# Patient Record
Sex: FEMALE | Race: ASIAN | URBAN - METROPOLITAN AREA
[De-identification: names, ages, dates, MRNs, and addresses within clinical notes are randomized per-mention and may not be internally consistent; named-entity substitution may affect disease eponyms.]

---

## 2014-07-11 LAB
HPV ABSTRACT: NORMAL
PAP-ABSTRACT: NORMAL

## 2017-09-22 LAB
HPV ABSTRACT: NORMAL
PAP-ABSTRACT: NORMAL

## 2017-09-28 ENCOUNTER — TRANSFERRED RECORDS (OUTPATIENT)
Dept: HEALTH INFORMATION MANAGEMENT | Facility: CLINIC | Age: 62
End: 2017-09-28

## 2018-05-11 ENCOUNTER — OFFICE VISIT (OUTPATIENT)
Dept: FAMILY MEDICINE | Facility: CLINIC | Age: 63
End: 2018-05-11
Payer: COMMERCIAL

## 2018-05-11 VITALS
SYSTOLIC BLOOD PRESSURE: 148 MMHG | TEMPERATURE: 98.4 F | HEIGHT: 59 IN | BODY MASS INDEX: 27.86 KG/M2 | WEIGHT: 138.2 LBS | DIASTOLIC BLOOD PRESSURE: 82 MMHG | HEART RATE: 76 BPM | OXYGEN SATURATION: 97 % | RESPIRATION RATE: 20 BRPM

## 2018-05-11 DIAGNOSIS — M54.42 ACUTE BILATERAL LOW BACK PAIN WITH BILATERAL SCIATICA: Primary | ICD-10-CM

## 2018-05-11 DIAGNOSIS — M54.41 ACUTE BILATERAL LOW BACK PAIN WITH BILATERAL SCIATICA: Primary | ICD-10-CM

## 2018-05-11 PROCEDURE — 99203 OFFICE O/P NEW LOW 30 MIN: CPT | Performed by: FAMILY MEDICINE

## 2018-05-11 RX ORDER — ASPIRIN 81 MG/1
81 TABLET, CHEWABLE ORAL
COMMUNITY
End: 2018-10-22

## 2018-05-11 RX ORDER — LISINOPRIL 20 MG/1
20 TABLET ORAL
COMMUNITY
Start: 2018-03-15 | End: 2018-10-22

## 2018-05-11 RX ORDER — CETIRIZINE HYDROCHLORIDE 10 MG/1
TABLET ORAL
COMMUNITY

## 2018-05-11 RX ORDER — CYCLOBENZAPRINE HCL 10 MG
5-10 TABLET ORAL 3 TIMES DAILY PRN
Qty: 40 TABLET | Refills: 0 | Status: SHIPPED | OUTPATIENT
Start: 2018-05-11 | End: 2018-10-22

## 2018-05-11 RX ORDER — SIMVASTATIN 20 MG
20 TABLET ORAL
COMMUNITY
Start: 2009-12-21 | End: 2018-10-22

## 2018-05-11 ASSESSMENT — PAIN SCALES - GENERAL: PAINLEVEL: NO PAIN (0)

## 2018-05-11 NOTE — Clinical Note
Please abstract the following data from this visit with this patient into the appropriate field in Epic:  Colonoscopy done on this date: 1/23/17 (exact), by this group: [facility], results were tubular adenoma on biopsy, colonoscopy due in 5 years.  Mammogram done on this date: 9/28/17 (exact), by this group: [facility], results were normal.   Pap smear with HPV co-testing done on this date: 9/22/17, by this group: [facility], both values are negative.

## 2018-05-11 NOTE — PROGRESS NOTES
SUBJECTIVE:   Yandy Crook is a 62 year old female who presents to clinic today for the following health issues:  She is accompanied by her .     She has previously doctored at [facility]. Her insurance recently changed so now she is switching to this clinic.     Back Pain       Duration: 2-3 months        Specific cause: unsure, maybe work and sitting - Long periods looking through microscope    Description:   Location of pain: low back both  Character of pain: burning and hurts more when sitting at work  Pain radiation: to the both buttocks  New numbness or weakness in legs, not attributed to pain:  no   Pain is intermittent - some days the pain is present, other days not    Intensity: Currently 0/10    History:   Pain interferes with job: YES  History of back problems: no prior back problems  Any previous MRI or X-rays: None  Sees a specialist for back pain:  No  Therapies tried without relief: alternating between Aleve and Advil - usually takes these before bedtime so is unsure how much they help, but she does note that when she wakes up she does not feel pain    Alleviating factors:   Improved by: temporary relief    Precipitating factors:  Worsened by: sitting    Functional and Psychosocial Screen (Erasto STarT Back):      Not performed today      Accompanying Signs & Symptoms:  Risk of Fracture:  None  Risk of Cauda Equina:  None  Risk of Infection:  None  Risk of Cancer:  None  Risk of Ankylosing Spondylitis:  Onset at age <35, male, AND morning back stiffness. no         Past medical, family, and social histories, medications, and allergies are reviewed and updated in [facility].     ROS:  CONSTITUTIONAL: NEGATIVE for fever, chills, change in weight  ENT/MOUTH: NEGATIVE for ear, mouth and throat problems  RESP: NEGATIVE for significant cough or SOB  CV: NEGATIVE for chest pain, palpitations or peripheral edema  ROS otherwise negative    This document serves as a record of the services and decisions  "personally performed and made by Dr. Silva. It was created on his behalf by Sandi Maloney, a trained medical scribe. The creation of this document is based the provider's statements to the medical scribe.  Sandi Maloney May 11, 2018 4:29 PM     OBJECTIVE:                                                    /82  Pulse 76  Temp 98.4  F (36.9  C) (Oral)  Resp 20  Ht 1.49 m (4' 10.66\")  Wt 62.7 kg (138 lb 3.2 oz)  SpO2 97%  Breastfeeding? No  BMI 28.24 kg/m2   Body mass index is 28.24 kg/(m^2).     GENERAL: healthy, alert and no distress  EYES: Eyes grossly normal to inspection, PERRL, EOMI, sclerae white and conjunctivae normal  MS: no gross musculoskeletal defects noted, no edema. No CVA tenderness.   SKIN: no suspicious lesions or rashes  NEURO: Normal strength and tone, sensory exam grossly normal, mentation intact, oriented times 3 and cranial nerves 2-12 intact, DTRs symmetrical, heel and toe walking normal  PSYCH: mentation appears normal, affect normal/bright     Diagnostic Test Results:  none      ASSESSMENT/PLAN:                                                      (M54.42,  M54.41) Acute bilateral low back pain with bilateral sciatica  (primary encounter diagnosis)  Comment: Exacerbated by poor posture at work. It's unclear whether her employer will be able or willing to address ergonomic issues at the workplace, so I ordered OT.   Plan: [facility], [facility] CHIROPRACTIC REFERRAL,         OCCUPATIONAL THERAPY REFERRAL, cyclobenzaprine         (FLEXERIL) 10 MG tablet        She is instructed to use either Aleve or Advil more consistently, and to take a dose before she goes to work.      The information in this document, created by the medical scribe for me, accurately reflects the services I personally performed and the decisions made by me. I have reviewed and approved this document for accuracy prior to leaving the patient care area. May 11, 2018 4:29 PM     Xavier Silva MD     "

## 2018-05-11 NOTE — MR AVS SNAPSHOT
After Visit Summary   5/11/2018    Yandy Crook    MRN: [id number]           Patient Information     Date Of Birth          1955        Visit Information        Provider Department      5/11/2018 3:45 PM Xavier Silva MD; MULTILINGUAL W[facility]        Today's Diagnoses     Acute bilateral low back pain with bilateral sciatica    -  1       Follow-ups after your visit        Additional Services     [facility] HAND, AND CH[facility]RACTIC REFERRAL       **This order will print in the [facility] Scheduling Office**    Physical Therapy, Hand Therapy and Chiropractic Care are available through:    *[facility]  *[facility]  *[facility]    Call one number to schedule at any of the above locations: [phone].    (Go to [email or web address] for a list of locations with addresses and office hours.)    Your provider has referred you to: Physical Therapy at [facility] or [facility] [place] [facility]    Indication/Reason for Referral: Low Back Pain  Onset of Illness: 2-3 months ago  Therapy Orders: Evaluate and Treat  Special Programs: None  Special Request: Equipment: As Indicated:   Special Request: Modalities: As Indicated:     Additional Comments for the Therapist or Chiropractor:     Please be aware that coverage of these services is subject to the terms and limitations of your health insurance plan.  Call member services at your health plan with any benefit or coverage questions.      Please bring the following to your appointment:    *Your personal calendar for scheduling future appointments  *Comfortable clothing            OCCUPATIONAL THERAPY REFERRAL       *This therapy referral will be filtered to a centralized scheduling office at [facility] and the patient will receive a call to schedule an appointment at a [place] location most convenient for them. *     [facility] provides Occupational Therapy  "evaluation and treatment and many specialty services across the [facility] system.  If requesting a specialty program, please choose from the list below.    If you have not heard from the scheduling office within 2 business days, please call [phone] for all locations, with the exception of [place], please call [phone] and [place], please call [phone]    Treatment: Evaluation & Treatment  Special Instructions/Modalities:   Special Programs: None    Please be aware that coverage of these services is subject to the terms and limitations of your health insurance plan.  Call member services at your health plan with any benefit or coverage questions.      **Note to Provider:  If you are referring outside of [facility] for the therapy appointment, please list the name of the location in the \"special instructions\" above, print the referral and give to the patient to schedule the appointment.                  Who to contact     If you have questions or need follow up information about today's clinic visit or your schedule please contact [facility] directly at [phone].  Normal or non-critical lab and imaging results will be communicated to you by MyChart, letter or phone within 4 business days after the clinic has received the results. If you do not hear from us within 7 days, please contact the clinic through MyChart or phone. If you have a critical or abnormal lab result, we will notify you by phone as soon as possible.  Submit refill requests through [facility] or call your pharmacy and they will forward the refill request to us. Please allow 3 business days for your refill to be completed.          Additional Information About Your Visit        ZYB Information     [facility]YB lets you send messages to your doctor, view your test results, renew your prescriptions, schedule appointments and more. To sign up, go to [email or web address] . Click on \"Log in\" on the left side of the " "screen, which will take you to the Welcome page. Then click on \"Sign up Now\" on the right side of the page.     You will be asked to enter the access code listed below, as well as some personal information. Please follow the directions to create your username and password.     Your access code is: [email or web address]  Expires: 2018  4:51 PM     Your access code will  in 90 days. If you need help or a new code, please call your [facility] clinic or [phone].        Care EveryWhere ID     This is your Care EveryWhere ID. This could be used by other organizations to access your [facility] medical records  [id number]        Your Vitals Were     Pulse Temperature Respirations Height Pulse Oximetry Breastfeeding?    76 98.4  F (36.9  C) (Oral) 20 1.49 m (4' 10.66\") 97% No    BMI (Body Mass Index)                   28.24 kg/m2            Blood Pressure from Last 3 Encounters:   18 174/90   07 124/80   06 122/70    Weight from Last 3 Encounters:   18 62.7 kg (138 lb 3.2 oz)   07 57 kg (125 lb 12 oz)   06 59.5 kg (131 lb 4 oz)              We Performed the Following     [facility], AND CHIROPRACTIC REFERRAL     OCCUPATIONAL THERAPY REFERRAL          Today's Medication Changes          These changes are accurate as of 18  4:51 PM.  If you have any questions, ask your nurse or doctor.               Start taking these medicines.        Dose/Directions    cyclobenzaprine 10 MG tablet   Commonly known as:  FLEXERIL   Used for:  Acute bilateral low back pain with bilateral sciatica   Started by:  Xavier Silva MD        Dose:  5-10 mg   Take 0.5-1 tablets (5-10 mg) by mouth 3 times daily as needed for muscle spasms (may cause dizziness or drowsiness)   Quantity:  40 tablet   Refills:  0            Where to get your medicines      These medications were sent to [facility] [id number] [place] [address], [place] - [address] AT [facility] [address], [facility] [address]     Phone:  [phone]     cyclobenzaprine 10 MG tablet                Primary Care Provider Fax #    Physician No Ref-Primary [phone]       No address on file        Equal Access to Services     ALYSSA LYLES : Lorena geiger rowdy abe Martinez, waaxda luqadaha, qaybta kaalmada ademartinez, kelly sigalamaryuri schreiber. So [facility] [phone].    ATENCIÓN: Si habla español, tiene a garcia disposición servicios gratuitos de asistencia lingüística. Llame al [phone].    We comply with applicable federal civil rights laws and [place] laws. We do not discriminate on the basis of race, color, national origin, age, disability, sex, sexual orientation, or gender identity.            Thank you!     Thank you for choosing [facility]  for your care. Our goal is always to provide you with excellent care. Hearing back from our patients is one way we can continue to improve our services. Please take a few minutes to complete the written survey that you may receive in the mail after your visit with us. Thank you!             Your Updated Medication List - Protect others around you: Learn how to safely use, store and throw away your medicines at www.disposemymeds.org.          This list is accurate as of 5/11/18  4:51 PM.  Always use your most recent med list.                   Brand Name Dispense Instructions for use Diagnosis    AMBIEN 10 MG tablet   Generic drug:  zolpidem     30    TAKE ONE-HALF TO ONE TABLET PO AT BEDTIME AS NEEDED    Insomnia, unspecified       aspirin 81 MG chewable tablet      Take 81 mg by mouth        calcium-vitamin D 600-400 MG-UNIT per tablet    CALTRATE          cetirizine 10 MG tablet    zyrTEC     Take 10 mg by mouth daily.        cyclobenzaprine 10 MG tablet    FLEXERIL    40 tablet    Take 0.5-1 tablets (5-10 mg) by mouth 3 times daily as needed for muscle spasms (may cause dizziness or drowsiness)    Acute bilateral low back pain with  bilateral sciatica       lisinopril 20 MG tablet    PRINIVIL/ZESTRIL     Take 20 mg by mouth        NO ACTIVE MEDICATIONS      .        simvastatin 20 MG tablet    ZOCOR     Take 20 mg by mouth

## 2018-05-11 NOTE — Clinical Note
Please abstract the following data from this visit with this patient into the appropriate field in Epic:  Colonoscopy done on this date: 02/23/18 (approximately), by this group: [facility], results were Normal.  Mammogram done on this date: 9/28/18 (approximately), by this group: [facility], results were Normal.  Pap smear done on this date: 9/22/18 (approximately), by this group: [facility], results were normal.

## 2018-10-22 ENCOUNTER — OFFICE VISIT (OUTPATIENT)
Dept: FAMILY MEDICINE | Facility: CLINIC | Age: 63
End: 2018-10-22
Payer: COMMERCIAL

## 2018-10-22 VITALS
RESPIRATION RATE: 20 BRPM | SYSTOLIC BLOOD PRESSURE: 170 MMHG | HEART RATE: 79 BPM | OXYGEN SATURATION: 97 % | BODY MASS INDEX: 27.99 KG/M2 | DIASTOLIC BLOOD PRESSURE: 80 MMHG | TEMPERATURE: 97.9 F | WEIGHT: 137 LBS

## 2018-10-22 DIAGNOSIS — M54.41 ACUTE BILATERAL LOW BACK PAIN WITH BILATERAL SCIATICA: ICD-10-CM

## 2018-10-22 DIAGNOSIS — E78.5 HYPERLIPIDEMIA LDL GOAL <130: ICD-10-CM

## 2018-10-22 DIAGNOSIS — E66.3 OVERWEIGHT (BMI 25.0-29.9): ICD-10-CM

## 2018-10-22 DIAGNOSIS — M54.42 ACUTE BILATERAL LOW BACK PAIN WITH BILATERAL SCIATICA: ICD-10-CM

## 2018-10-22 DIAGNOSIS — I10 ESSENTIAL HYPERTENSION: Primary | ICD-10-CM

## 2018-10-22 PROBLEM — E55.9 VITAMIN D DEFICIENCY: Status: ACTIVE | Noted: 2018-03-15

## 2018-10-22 PROCEDURE — 82043 UR ALBUMIN QUANTITATIVE: CPT | Performed by: NURSE PRACTITIONER

## 2018-10-22 PROCEDURE — 80053 COMPREHEN METABOLIC PANEL: CPT | Performed by: NURSE PRACTITIONER

## 2018-10-22 PROCEDURE — 83721 ASSAY OF BLOOD LIPOPROTEIN: CPT | Performed by: NURSE PRACTITIONER

## 2018-10-22 PROCEDURE — 99214 OFFICE O/P EST MOD 30 MIN: CPT | Performed by: NURSE PRACTITIONER

## 2018-10-22 PROCEDURE — 36415 COLL VENOUS BLD VENIPUNCTURE: CPT | Performed by: NURSE PRACTITIONER

## 2018-10-22 RX ORDER — ASPIRIN 81 MG/1
81 TABLET, CHEWABLE ORAL DAILY
Qty: 90 TABLET | Refills: 3 | Status: SHIPPED | OUTPATIENT
Start: 2018-10-22

## 2018-10-22 RX ORDER — LISINOPRIL 20 MG/1
20 TABLET ORAL DAILY
Qty: 90 TABLET | Refills: 1 | Status: SHIPPED | OUTPATIENT
Start: 2018-10-22 | End: 2018-11-05

## 2018-10-22 RX ORDER — SIMVASTATIN 20 MG
20 TABLET ORAL AT BEDTIME
Qty: 90 TABLET | Refills: 1 | Status: SHIPPED | OUTPATIENT
Start: 2018-10-22 | End: 2018-11-05

## 2018-10-22 RX ORDER — CYCLOBENZAPRINE HCL 10 MG
5-10 TABLET ORAL 3 TIMES DAILY PRN
Qty: 40 TABLET | Refills: 0 | Status: SHIPPED | OUTPATIENT
Start: 2018-10-22

## 2018-10-22 ASSESSMENT — PAIN SCALES - GENERAL: PAINLEVEL: NO PAIN (0)

## 2018-10-22 NOTE — MR AVS SNAPSHOT
After Visit Summary   10/22/2018    Yandy Crook    MRN: [id number]           Patient Information     Date Of Birth          1955        Visit Information        Provider Department      10/22/2018 6:05 PM Makeda Wu APRN CNP; [facility]        Today's Diagnoses     Essential hypertension    -  1    Hyperlipidemia LDL goal <130        Overweight (BMI 25.0-29.9)        Acute bilateral low back pain with bilateral sciatica          Care Instructions    At [facility], we strive to deliver an exceptional experience to you, every time we see you.  If you receive a survey in the mail, please send us back your thoughts. We really do value your feedback.    Your care team:                            Family Medicine Internal Medicine   MD Hasmukh Mast MD Shantel Branch-Fleming, MD Katya Georgiev PA-C Megan Hill, APRN CNP Nam Ho, MD Pediatrics   NIECY Kamara, MD Katja Jiang APRMD Essence Vasquez CNP, MD Deborah Mielke, MD Kim Thein, APRN CNP      Clinic hours: Monday - Thursday 7 am-7 pm; Fridays 7 am-5 pm.   Urgent care: Monday - Friday 11 am-9 pm; Saturday and Sunday 9 am-5 pm.  Pharmacy : Monday -Thursday 8 am-8 pm; Friday 8 am-6 pm; Saturday and Sunday 9 am-5 pm.     Clinic: [phone]   Pharmacy: [phone]        Áp Apolinar?t Samuels Không Ki?m Soát ???c (?ã ???c C?ng C?)    Áp apolinar?t c?a quý v? hôm nay samuels m?t cách khác th??ng. ?i?u này có th? x?y ra n?u quý v? b? l? các li?u l??ng thu?c tr? áp apolinar?t. Ho?c nó có th? x?y ra n?u quý v? dùng các lo?i thu?c khác. Các thu?c này mayo g?m m?t s? thu?c hít tr? b?nh monica?n, thu?c ch?ng ngh?t m?i, thu?c ?n kiêng, và ma tuý bán ngoài ???ng ph? nh? cô ca in và amphetamine.  Các nguyên nhân khác mayo g?m:    Lên cân    Thêm mu?i vào ch? ?? ?n c?a quý v?    Hút thu?c lá    Cà phê in  Áp apolinar?t c?a quý v? c?ng  có th? dori t?ng n?u quý v? b? r?i lo?n v? xúc c?m ho?c ?au ??n cùng c?c. Nó có th? tr? l?i bình th??ng lin m?t th?i k? ngh? ng?i.  S? ??c áp rosa maria?t g?m có 2 con s?. Có m?t con s? ? trên m?t con s? phía d??i. Con s? trên cùng là áp rosa maria?t tâm thu. Con s? bên d??i là áp rosa maria?t tâm tr??ng. Áp rosa maria?t bình th??ng là d??i 120 trên 80. Áp rosa maria?t tubbs (máu tubbs) là khi con s? ? trên t? 140 tr? lên. Ho?c khi con s? ? d??i t? 90 tr? lên. ?? ???c coi là áp rosa maria?t tubbs, các con s? ph?i tubbs h?n khi ???c th? wayne m?t obed?ng th?i tessa. Áp rosa maria?t gi?a bình th??ng và tubbs ???c g?i là ti?n áp rosa maria?t tubbs.  Ch?m sóc t?i dori  ?i?u lubna tr?ng là ph?i t?ng b??c tìm cách h? áp rosa maria?t c?a quý v?. N?u quý v? dùng thu?c tr? áp rosa maria?t, các h??ng d?n d??i ?ây có th? giúp quý v? c?n dùng ít thu?c ho?c không dùng thu?c wayne t??ng baker.    B?t ??u m?t ch??ng trình s?t cân n?u quý v? quá n?ng cân.    C?t gi?m l??ng mu?i wayne ch? ?? ?n c?a quý v?:  ? Tránh các th?c ph?m có huong?u mu?i nh? ô hutchison, ?? anjel, th?t hun khói, và khoai tây chiên m?n.  ? Không thêm mu?i vào th?c ?n c?a quý v? t?i bàn.  ? Ch? dùng m?t l??ng mu?i nh? khi n?u ?n.    B?t ??u m?t ch??ng trình t?p th? d?c. Bàn v?i nhân viên y t? c?a quý v? v? ch??ng trình t?p th? d?c nào là t?t nh?t cho quý v?. Không c?n ph?i khó nh?c l?m m?i làm ???c. Th?m chí ?i b? nhanh wayne 20 phút 3 l?n m?t tu?n là m?t hình th?c t?p th? d?c t?t.    Tránh dùng thu?c có ch[facility] ?ó. Thu?c này mayo g?m huong?u thu?c c?m và thu?c viên và thu?c x?t ch?ng ngh?t xoang m?i, c?ng nh? thu?c viên ?n kiêng. Ki?m tra các c?nh báo v? áp rosa maria?t tubbs trên nhãn hi?u. Các ch?t kích thí nh? amphetamine ho?c cô ca in có th? ch?t ng??i ??i v?i m?t s? ng??i b? ch?ng áp rosa maria?t tubbs. Không mayo gi? ???c dùng các thu?c này.    Gi?i h?n l??ng cà phê in mà quý v? u?ng. Ho?c ??i kenneth các th?c u?ng mà không có ch?t cà phê in.    Ng?ng hút thu?c lá. N?u quý v? là ng??i hút thu?c lá lâu n?m, ?i?u này có th? khó. Latasha avery m?omari ??pepper farley  katrina thu?c lá ?? t?o thêm c? h?i thành công cho quý v?. Bàn v?i bác s? c?a quý v? v? các ph??ng cách b? hút.    Tìm hi?u cách ?ng phó v?i s? c?ng th?ng t?t h?n. ?ây là ph?n lubna tr?ng c?a b?t c? ch??ng trình nào ?? h? áp rosa maria?t. Tìm các ph??ng cách ?? th? giãn. Các cách nào mayo g?m thi?n ??nh, t?p yoga, và ph?n h?i sinh h?c.    N?u thu?c ???c kê toa, fern dùng ?úng dillon ch? d?n. Vi?c b? l? các li?u l??ng có th? làm cho áp rosa maria?t c?a quý v? không ki?m ch? ???c.    Xét ??n vi?c michelle m?t máy t? ??ng ?o áp rosa maria?t. Nh?ng máy này có bán t?i huong?u d??c phòng. Dùng máy này ?? dillon dõi áp rosa maria?t c?a quý v?. Báo cáo k?t qu? cho bác s? bi?t.  Ch?m sóc dillon dõi  ?i khám th??ng xuyên v?i nhân viên y t? c?a quý v? v? áp rosa maria?t và ki?m tra thu?c men là m?t ph?n lubna tr?ng wayne s? ch?m sóc c?a quý v?. L?y h?n ?i khám dillon dõi dillon s? ch? d?n.  Khi nào ?i tìm s? khuyên nh? v? y t?  G?i nhân viên y t? ngay n?u quý v? b? b?t c? nh?ng ?i?u nào lin ?ây:    ?au ng?c, cánh davide, vai, c?, ho?c ph?n l?ng phía trên    Th? d?c    Nh?c ??u n?ng    Có ti?ng thình th?ch ho?c xào s?c wayne whitney    Ch?y máu cam    C?c k? bu?n ng?, l?n l?n, ho?c ng?t x?u    Chóng m?t ho?c chóng m?t v?i c?m giác sherley cu?ng (kim m?t)    Y?u n?i cánh davide ho?c c?ng chân ho?c ? m?t bên m?t    Khó nói n?ng ho?c nhìn   Date Last Reviewed: 11/25/2014 2000-2017 [employer]. [address] [place] [address], [place] [address]. All rights reserved. This information is not intended as a substitute for professional medical care. Always follow your healthcare professional's instructions.        T? ch?m sóc ?au l?ng d??i (low back pain)  H?u h?t m?i ng??i ??u th?nh tho?ng b? ?au l?ng d??i. Wayne huong?u tr??ng h?p, v?n ?? không nghiêm tr?ng và ch? c?n t? ch?m sóc là ???c. ?ôi khi ?au l?ng d??i có th? là d?u hi?u cho th?y có m?t v?n ?? l?n h?n. Hãy g?i cho bác s? c?a quý v? n?u c?n ?au tr? l?i th??ng xuyên ho?c ngày càng t? h?n. ?? ch?m sóc l?ng c?a quý v? v? lâu dài, fern th??pepper wilhelm  t?p th? d?ng, gi?m cân n?ng d? th?a và h?c các t? th? t?t.    Ngh? ng?i ng?n  ?? l?ng c?a quý v? ngh? ng?i m?t lanny ngày ?? h?i ph?c. Dùng ??m c?ng ho?c sàn nhà. S? d?ng g?i ho?c kh?n nh? ?? ch?c ch?n ph?n l?ng d??i c?a quý v?. Gi? cho ??u g?i c?a quý v? h?i kobe và ??t m?t chi?c g?i khác bên d??i. Lin vài gi?, ng?i d?y và ?i l?i càng huong?u càng t?t.  Gi?m ?au và s?ng  L?nh giúp gi?m s?ng. C? l?nh và nóng ??u có th? gi?m ?au. B?o v? da c?a quý v? b?ng cách ??t m?t t?m kh?n gi?a c? th? c?a quý v? và luis?n l?nh ho?c nóng.    Wayne vài ngày ??u tiên, ch??m m?t túi ?á wayne 10-15 phút m?i gi? wayne khi quý v? ?ang th?c.    Lin vài ngày ??u, th? dùng huong?t ?? gi?m c?n ?au.    Thu?c bán kenneth qu?y có th? giúp ki?m soát c?n ?au và s?ng. Hãy th? aspirin ho?c thu?c thay th? aspirin, ch?ng h?n nh? ibuprofen.    T?p th? d?c  T?p th? d?c có th? giúp l?ng c?a quý v? h?i ph?c. Nó c?ng giúp l?ng c?a quý v? kh?e h?n và lucita ho?t h?n, ng?n ng?a tái ch?n th??ng. Hãy h?i bác s? c?a quý v? v? các bài t?p th? d?c c? th? dành cho l?ng c?a mình.  Áp d?ng t? th? t?t ?? tránh tái ch?n th??ng    Khi di jacek?n, hãy kobe hông và g?i. ??ng kobe ? vùng th?t l?ng hay v?n quanh.    Khi nâng, gi? ?? v?t sát v?i c? th? c?a quý v?. ??ng c? nâng ?? n?ng quá s?c c?a b?n thân.    Khi ng?i, nh? ?? ph?n l?ng d??i c?a quý v?. S? d?ng kh?n qu?n quanh n?u c?n.  Hãy g?i cho bác s? c?a quý v? n?u:    Quý v? không th? ??ng ho?c ?i b?.    Quý v? b? s?t trên 101,0 F.    Quý v? ?i ti?u th??ng xuyên, ?au ho?c ra máu.    Quý v? b? ?au b?ng d? d?i.    Quý v? b? ?au nh? c?t ho?c ?âm.    C?n ?au c?a quý v? di?n ra không d?t.    Quý v? b? ?au ho?c tê chân.    Quý v? c?m th?y ?au ? m?t vùng m?i trên l?ng c?a mình.    Quý v? ?? ý th?y c?n ?au không thuyên gi?m lin h?n m?t tu?n.   Date Last Reviewed: 9/29/2015 2000-2017 [employer]. [address] [place] [address], [place] [address]. All rights reserved. This information is not intended as a substitute for  "professional medical care. Always follow your healthcare professional's instructions.                Follow-ups after your visit        Follow-up notes from your care team     Return in about 2 weeks (around 2018), or if symptoms worsen or fail to improve, for BP Recheck.      Who to contact     If you have questions or need follow up information about today's clinic visit or your schedule please contact [facility] directly at [phone].  Normal or non-critical lab and imaging results will be communicated to you by [employer], letter or phone within 4 business days after the clinic has received the results. If you do not hear from us within 7 days, please contact the clinic through [employer] or phone. If you have a critical or abnormal lab result, we will notify you by phone as soon as possible.  Submit refill requests through [employer] or call your pharmacy and they will forward the refill request to us. Please allow 3 business days for your refill to be completed.          Additional Information About Your Visit        [employer] Information     [employer] lets you send messages to your doctor, view your test results, renew your prescriptions, schedule appointments and more. To sign up, go to [email or web address] . Click on \"Log in\" on the left side of the screen, which will take you to the Welcome page. Then click on \"Sign up Now\" on the right side of the page.     You will be asked to enter the access code listed below, as well as some personal information. Please follow the directions to create your username and password.     Your access code is: [email or web address]  Expires: 2019  7:05 PM     Your access code will  in 90 days. If you need help or a new code, please call your [place] clinic or [phone].        Care EveryWhere ID     This is your Care EveryWhere ID. This could be used by other organizations to access your [place] medical records  [id number]        Your Vitals Were  "    Pulse Temperature Respirations Pulse Oximetry BMI (Body Mass Index)       79 97.9  F (36.6  C) (Oral) 20 97% 27.99 kg/m2        Blood Pressure from Last 3 Encounters:   10/22/18 170/80   05/11/18 148/82   02/20/07 124/80    Weight from Last 3 Encounters:   10/22/18 137 lb (62.1 kg)   05/11/18 138 lb 3.2 oz (62.7 kg)   02/20/07 125 lb 12 oz (57 kg)              We Performed the Following     Albumin Random Urine Quantitative with Creat Ratio     Comprehensive metabolic panel     LDL cholesterol direct          Today's Medication Changes          These changes are accurate as of 10/22/18  7:06 PM.  If you have any questions, ask your nurse or doctor.               These medicines have changed or have updated prescriptions.        Dose/Directions    aspirin 81 MG chewable tablet   This may have changed:  when to take this   Used for:  Essential hypertension   Changed by:  Makeda Wu APRN CNP        Dose:  81 mg   Take 1 tablet (81 mg) by mouth daily   Quantity:  90 tablet   Refills:  3       lisinopril 20 MG tablet   Commonly known as:  PRINIVIL/ZESTRIL   This may have changed:  when to take this   Used for:  Essential hypertension   Changed by:  Makeda Wu APRN CNP        Dose:  20 mg   Take 1 tablet (20 mg) by mouth daily   Quantity:  90 tablet   Refills:  1       simvastatin 20 MG tablet   Commonly known as:  ZOCOR   This may have changed:  when to take this   Used for:  Hyperlipidemia LDL goal <130   Changed by:  Makeda Wu APRN CNP        Dose:  20 mg   Take 1 tablet (20 mg) by mouth At Bedtime   Quantity:  90 tablet   Refills:  1            Where to get your medicines      These medications were sent to [id number] - [place], [place] - [address] AT [facility] [address], [facility] [address]     Phone:  [phone]     aspirin 81 MG chewable tablet    cyclobenzaprine 10 MG tablet    lisinopril 20 MG tablet    simvastatin 20 MG  tablet                Primary Care Provider Fax #    Physician No Ref-Primary [phone]       No address on file        Equal Access to Services     ALYSSA LYLES : Hadii aad ku hadbren Martinez, warolandada lutony, yvonne kamichaelda enriqueta, kelly fernandain hayaageovanna khannas sanchez labenjamingeovanna schreiber. So [facility] [phone].    ATENCIÓN: Si habla español, tiene a garcia disposición servicios gratuitos de asistencia lingüística. Llame al [phone].    We comply with applicable federal civil rights laws and [place] laws. We do not discriminate on the basis of race, color, national origin, age, disability, sex, sexual orientation, or gender identity.            Thank you!     Thank you for choosing [facility]  for your care. Our goal is always to provide you with excellent care. Hearing back from our patients is one way we can continue to improve our services. Please take a few minutes to complete the written survey that you may receive in the mail after your visit with us. Thank you!             Your Updated Medication List - Protect others around you: Learn how to safely use, store and throw away your medicines at www.disposemymeds.org.          This list is accurate as of 10/22/18  7:06 PM.  Always use your most recent med list.                   Brand Name Dispense Instructions for use Diagnosis    aspirin 81 MG chewable tablet     90 tablet    Take 1 tablet (81 mg) by mouth daily    Essential hypertension       calcium carbonate 600 mg-vitamin D 400 units 600-400 MG-UNIT per tablet    CALTRATE          cetirizine 10 MG tablet    zyrTEC     Take 10 mg by mouth daily.        cyclobenzaprine 10 MG tablet    FLEXERIL    40 tablet    Take 0.5-1 tablets (5-10 mg) by mouth 3 times daily as needed for muscle spasms (may cause dizziness or drowsiness)    Acute bilateral low back pain with bilateral sciatica       lisinopril 20 MG tablet    PRINIVIL/ZESTRIL    90 tablet    Take 1 tablet (20 mg) by mouth daily    Essential  hypertension       simvastatin 20 MG tablet    ZOCOR    90 tablet    Take 1 tablet (20 mg) by mouth At Bedtime    Hyperlipidemia LDL goal <130

## 2018-10-22 NOTE — PATIENT INSTRUCTIONS
At [facility], we strive to deliver an exceptional experience to you, every time we see you.  If you receive a survey in the mail, please send us back your thoughts. We really do value your feedback.    Your care team:                            Family Medicine Internal Medicine   MD Hasmukh Mast MD Shantel Branch-Fleming, MD Katya Georgiev PA-C Megan Hill, APRN LUIS Gerard, MD Pediatrics   Nir Reid, NIECY Butler, MD Katja Jiang APRN CNP   MD Essence Ramirez MD Deborah Mielke, MD Yandy Wu, APRN [facility]      Clinic hours: Monday - Thursday 7 am-7 pm; Fridays 7 am-5 pm.   Urgent care: Monday - Friday 11 am-9 pm; Saturday and Sunday 9 am-5 pm.  Pharmacy : Monday -Thursday 8 am-8 pm; Friday 8 am-6 pm; Saturday and Sunday 9 am-5 pm.     Clinic: [phone]   Pharmacy: [phone]        Áp Apolinar?t Samuels Không Ki?m Soát ???c (?ã ???c C?ng C?)    Áp apolinar?t c?a quý v? hôm nay samuels m?t cách khác th??ng. ?i?u này có th? x?y ra n?u quý v? b? l? các li?u l??ng thu?c tr? áp apolinar?t. Ho?c nó có th? x?y ra n?u quý v? dùng các lo?i thu?c khác. Các thu?c này mayo g?m m?t s? thu?c hít tr? b?nh monica?n, thu?c ch?ng ngh?t m?i, thu?c ?n kiêng, và ma tuý bán ngoài ???ng ph? nh? cô ca in và amphetamine.  Các nguyên nhân khác mayo g?m:    Lên cân    Thêm mu?i vào ch? ?? ?n c?a quý v?    Hút thu?c lá    Cà phê in  Áp apolinar?t c?a quý v? c?ng có th? dori t?ng n?u quý v? b? r?i lo?n v? xúc c?m ho?c ?au ??n cùng c?c. Nó có th? tr? l?i bình th??ng lin m?t th?i k? ngh? ng?i.  S? ??c áp apolinar?t g?m có 2 con s?. Có m?t con s? ? trên m?t con s? phía d??i. Con s? trên cùng là áp apolinar?t tâm thu. Con s? bên d??i là áp apolinar?t tâm tr??ng. Áp apolinar?t bình th??ng là d??i 120 trên 80. Áp apolinar?t samuels (máu samuels) là khi con s? ? trên t? 140 tr? lên. Ho?c khi con s? ? d??i t? 90 tr? lên. ?? ???c coi là áp apolinar?t samuels, các con s? ph?i samuels h?n khi ???c th? wayne m?t obed?ng th?nidia zarate.  Áp rosa maria?t gi?a bình th??ng và tubbs ???c g?i là ti?n áp rosa maria?t tubbs.  Ch?m sóc t?i dori  ?i?u lubna tr?ng là ph?i t?ng b??c tìm cách h? áp rosa maria?t c?a quý v?. N?u quý v? dùng thu?c tr? áp rosa maria?t, các h??ng d?n d??i ?ây có th? giúp quý v? c?n dùng ít thu?c ho?c không dùng thu?c wayne t??ng baker.    B?t ??u m?t ch??ng trình s?t cân n?u quý v? quá n?ng cân.    C?t gi?m l??ng mu?i wayne ch? ?? ?n c?a quý v?:  ? Tránh các th?c ph?m có huong?u mu?i nh? ô hutch[place], ?? anjel, th?t hun khói, và khoai tây chiên m?n.  ? Không thêm mu?i vào th?c ?n c?a quý v? t?i bàn.  ? Ch? dùng m?t l??ng mu?i nh? khi n?u ?n.    B?t ??u m?t ch??ng trình t?p th? d?c. Bàn v?i nhân viên y t? c?a quý v? v? ch??ng trình t?p th? d?c nào là t?t nh?t cho quý v?. Không c?n ph?i khó nh?c l?m m?i làm ???c. Th?m chí ?i b? nhanh wayne 20 phút 3 l?n m?t tu?n là m?t hình th?c t?p th? d?c t?t.    Tránh dùng thu?c có ch?t kích thích jen wayne ?ó. Thu?c này mayo g?m huong?u thu?c c?m và thu?c viên và thu?c x?t ch?ng ngh?t xoang m?i, c?ng nh? thu?c viên ?n kiêng. Ki?m tra các c?nh báo v? áp rosa maria?t tubbs trên nhãn hi?u. Các ch?t kích thích nh? amphetamine ho?c cô ca in có th? ch?t ng??i ??i v?i m?t s? ng??i b? ch?ng áp rosa maria?t tubbs. Không mayo gi? ???c dùng các thu?c này.    Gi?i h?n l??ng cà phê in mà quý v? u?ng. Ho?c ??i kenneth các th?c u?ng mà không có ch?t cà phê in.    Ng?ng hút thu?c lá. N?u quý v? là ng??i hút thu?c lá lâu n?m, ?i?u này có th? khó. Ghi marcela vào m?t ch??ng trình katrina thu?c lá ?? t?o thêm c? h?i thành công cho quý v?. Bàn v?i bác s? c?a quý v? v? các ph??ng cách b? hút.    Tìm hi?u cách ?ng phó v?i s? c?ng th?ng t?t h?n. ?ây là ph?n lubna tr?ng c?a b?t c? ch??ng trình nào ?? h? áp rosa maria?t. Tìm các ph??ng cách ?? th? giãn. Các cách nào mayo g?m thi?n ??nh, t?p yoga, và ph?n h?i sinh h?c.    N?u thu?c ???c kê torocky, fern rick ?úng dillon ch? d?n. Vi?c b? l? các li?u l??ng có th? làm cho áp rosa maria?t c?a quý v? không ki?m ch? ???c.    Xét ??n vi?c michelle m?t máy t? ??ng ?o áp rosa maria?t. Nh?ng  máy này có bán t?i huong?u d??c phòng. Dùng máy này ?? dillon dõi áp rosa maria?t c?a quý v?. Báo cáo k?t qu? cho bác s? bi?t.  Ch?m sóc dillon dõi  ?i khám th??ng xuyên v?i nhân viên y t? c?a quý v? v? áp rosa maria?t và ki?m tra thu?c men là m?t ph?n lubna tr?ng wayne s? ch?m sóc c?a quý v?. L?y h?n ?i khám idllon dõi dillon s? ch? d?n.  Khi nào ?i tìm s? khuyên nh? v? y t?  G?i nhân viên y t? ngay n?u quý v? b? b?t c? nh?ng ?i?u nào ciara ?ây:    ?au ng?c, cánh davide, vai, c?, ho?c ph?n l?ng phía trên    Th? d?c    Nh?c ??u n?ng    Có ti?ng thình th?ch ho?c xào s?c wayne whitney    Ch?y máu cam    C?c k? bu?n ng?, l?n l?n, ho?c ng?t x?u    Chóng m?t ho?c chóng m?t v?i c?m giác sherley cu?ng (kim m?t)    Y?u n?i cánh davide ho?c c?ng chân ho?c ? m?t bên m?t    Khó nói n?ng ho?c nhìn   Date Last Reviewed: 11/25/2014 2000-2017 [employer]. [address] [place] [address], [place] [address]. All rights reserved. This information is not intended as a substitute for professional medical care. Always follow your healthcare professional's instructions.        T? ch?m sóc ?au l?ng d??i (low back pain)  H?u h?t m?i ng??i ??u th?nh tho?ng b? ?au l?ng d??i. Wayne huong?u tr??ng h?p, v?n ?? không nghiêm tr?ng và ch? c?n t? ch?m sóc là ???c. ?ôi khi ?au l?ng d??i có th? là d?u hi?u cho th?y có m?t v?n ?? l?n h?n. Hãy g?i cho bác s? c?a quý v? n?u c?n ?au tr? l?i th??ng xuyên ho?c ngày càng t? h?n. ?? ch?m sóc l?ng c?a quý v? v? lâu dài, hãy th??ng xuyên t?p th? d?ng, gi?m cân n?ng d? th?a và h?c các t? th? t?t.    Ngh? ng?i ng?n  ?? l?ng c?a quý v? ngh? ng?i m?t lanny ngày ?? h?i ph?c. Dùng ??m c?ng ho?c sàn nhà. S? d?ng g?i ho?c kh?n nh? ?? ch?c ch?n ph?n l?ng d??i c?a quý v?. Gi? cho ??u g?i c?a quý v? h?i kobe và ??t m?t chi?c g?i khác bên d??i. Ciara vài gi?, ng?i d?y và ?i l?i càng huong?u càng t?t.  Gi?m ?au và s?ng  L?nh giúp gi?m s?ng. C? l?nh và nóng ??u có th? gi?m ?au. B?o v? da c?a quý v? b?ng cách ??t m?t t?m kh?n gi?a c? th? c?a quý v? và luis?n l?nh  ho?c nóng.    Wayne vài ngày ??u tiên, ch??m m?t túi ?á wayne 10-15 phút m?i gi? wayne khi quý v? ?ang th?c.    Ciara vài ngày ??u, th? dùng huong?t ?? gi?m c?n ?au.    Thu?c bán kenneth qu?y có th? giúp ki?m soát c?n ?au và s?ng. Hãy th? aspirin ho?c thu?c thay th? aspirin, ch?ng h?n nh? ibuprofen.    T?p th? d?c  T?p th? d?c có th? giúp l?ng c?a quý v? h?i ph?c. Nó c?ng giúp l?ng c?a quý v? kh?e h?n và lucita ho?t h?n, ng?n ng?a tái ch?n th??ng. Hãy h?i bác s? c?a quý v? v? các bài t?p th? d?c c? th? dành cho l?ng c?a mình.  Áp d?ng t? th? t?t ?? tránh tái ch?n th??ng    Khi di jacek?n, hãy kobe hông và g?i. ??ng kobe ? vùng th?t l?ng hay v?n quanh.    Khi nâng, gi? ?? v?t sát v?i c? th? c?a quý v?. ??ng c? nâng ?? n?ng quá s?c c?a b?n thân.    Khi ng?i, nh? ?? ph?n l?ng d??i c?a quý v?. S? d?ng kh?n qu?n quanh n?u c?n.  Hãy g?i cho bác s? c?a quý v? n?u:    Quý v? không th? ??ng ho?c ?i b?.    Quý v? b? s?t trên 101,0 F.    Quý v? ?i ti?u th??ng xuyên, ?au ho?c ra máu.    Quý v? b? ?au b?ng d? d?i.    Quý v? b? ?au nh? c?t ho?c ?âm.    C?n ?au c?a quý v? di?n ra không d?t.    Quý v? b? ?au ho?c tê chân.    Quý v? c?m th?y ?au ? m?t vùng m?i trên l?ng c?a mình.    Quý v? ?? ý th?y c?n ?au không thuyên gi?m ciara h?n m?t tu?n.   Date Last Reviewed: 9/29/2015 2000-2017 [employer]. [address] [place] [address], [place] [address]. All rights reserved. This information is not intended as a substitute for professional medical care. Always follow your healthcare professional's instructions.

## 2018-10-22 NOTE — Clinical Note
Please abstract the following data from this visit with this patient into the appropriate field in Epic:  Influenza vaccine last month per patient at an allergists office (unknown name).

## 2018-10-22 NOTE — PROGRESS NOTES
SUBJECTIVE:   Yandy Crook is a 63 year old female who presents to clinic today for the following health issues:    Hyperlipidemia Follow-Up      Rate your low fat/cholesterol diet?: not monitoring fat    Taking statin?  Yes, no muscle aches from statin    Other lipid medications/supplements?:  none  Ran out of Zocor 2 weeks ago.    Hypertension Follow-up      Outpatient blood pressures are not being checked.    Low Salt Diet: low salt    Amount of exercise or physical activity: None    Problems taking medications regularly: No    Medication side effects: none    Diet: low salt  Ran out of Lisinopril 2 weeks ago.      Back pain- ongoing for several months. She sits for prolonged periods, works  At [employer], has pain with prolonged sitting, pain radiates to both buttocks, no weakness in legs.  She has been seen for this in the past and given NSAID and muscle relaxer which helps with pain.   She has not been seen by physical therapy.    Problem list and histories reviewed & adjusted, as indicated.  Additional history: as documented    Patient Active Problem List   Diagnosis     Insomnia     No past surgical history on file.    Social History   Substance Use Topics     Smoking status: Never Smoker     Smokeless tobacco: Never Used     Alcohol use No     Family History   Problem Relation Age of Onset     Family History Negative No family hx of          Current Outpatient Prescriptions   Medication Sig Dispense Refill     aspirin 81 MG chewable tablet Take 81 mg by mouth       calcium-vitamin D (CALTRATE) 600-400 MG-UNIT per tablet        cetirizine (ZYRTEC) 10 MG tablet Take 10 mg by mouth daily.       cyclobenzaprine (FLEXERIL) 10 MG tablet Take 0.5-1 tablets (5-10 mg) by mouth 3 times daily as needed for muscle spasms (may cause dizziness or drowsiness) 40 tablet 0     lisinopril (PRINIVIL/ZESTRIL) 20 MG tablet Take 20 mg by mouth       simvastatin (ZOCOR) 20 MG tablet Take 20 mg by mouth       BP  Readings from Last 3 Encounters:   10/22/18 170/80   05/11/18 148/82   02/20/07 124/80    Wt Readings from Last 3 Encounters:   10/22/18 137 lb (62.1 kg)   05/11/18 138 lb 3.2 oz (62.7 kg)   02/20/07 125 lb 12 oz (57 kg)                    Reviewed and updated as needed this visit by clinical staff  Tobacco  Allergies  Meds       Reviewed and updated as needed this visit by Provider         ROS:  Constitutional, HEENT, cardiovascular, pulmonary, gi and gu systems are negative, except as otherwise noted.    OBJECTIVE:     /80 (BP Location: Left arm, Patient Position: Chair, Cuff Size: Adult Regular)  Pulse 79  Temp 97.9  F (36.6  C) (Oral)  Resp 20  Wt 137 lb (62.1 kg)  SpO2 97%  BMI 27.99 kg/m2  Body mass index is 27.99 kg/(m^2).  GENERAL: healthy, alert and no distress  EYES: Eyes grossly normal to inspection, PERRL and conjunctivae and sclerae normal  HENT: ear canals and TM's normal, nose and mouth without ulcers or lesions  NECK: no adenopathy, no asymmetry, masses, or scars and thyroid normal to palpation  RESP: lungs clear to auscultation - no rales, rhonchi or wheezes  CV: regular rate and rhythm, normal S1 S2, no S3 or S4, no murmur, click or rub, no peripheral edema and peripheral pulses strong  ABDOMEN: soft, nontender, no hepatosplenomegaly, no masses and bowel sounds normal  MS: no gross musculoskeletal defects noted, no edema  SKIN: no suspicious lesions or rashes  NEURO: Normal strength and tone, sensory exam grossly normal, mentation intact, oriented times 3 and cranial nerves 2-12 intact, DTRs symmetrical, heel and toe walking normal  BACK: no CVA tenderness, no paralumbar tenderness  PSYCH: mentation appears normal, affect normal/bright  LYMPH: normal ant/post cervical, supraclavicular nodes    Diagnostic Test Results:  No results found for this or any previous visit (from the past 24 hour(s)).    ASSESSMENT/PLAN:         BMI:   Estimated body mass index is 27.99 kg/(m^2) as  "calculated from the following:    Height as of 5/11/18: 4' 10.66\" (1.49 m).    Weight as of this encounter: 137 lb (62.1 kg).   Weight management plan: Discussed healthy diet and exercise guidelines and patient will follow up in 6 months in clinic to re-evaluate.      1. Essential hypertension  Uncontrolled but she has been off her medication for 2 weeks  Getting her back on Lisinopril and she is to watch the salt in her diet, recheck BP in 2 weeks.  - Albumin Random Urine Quantitative with Creat Ratio  - Comprehensive metabolic panel  - aspirin 81 MG chewable tablet; Take 1 tablet (81 mg) by mouth daily  Dispense: 90 tablet; Refill: 3  - lisinopril (PRINIVIL/ZESTRIL) 20 MG tablet; Take 1 tablet (20 mg) by mouth daily  Dispense: 90 tablet; Refill: 1    2. Hyperlipidemia LDL goal <130  Getting her back on Zocor, checking LDL today.  Low chol diet discussed as well as benefits of regular exercise.  - LDL cholesterol direct  - Comprehensive metabolic panel  - simvastatin (ZOCOR) 20 MG tablet; Take 1 tablet (20 mg) by mouth At Bedtime  Dispense: 90 tablet; Refill: 1    3. Overweight (BMI 25.0-29.9)  Benefits of weight loss reviewed in detail, encouraged her to cut back on the carbohydrates in the diet, consume more fruits and vegetables, drink plenty of water, avoid fruit juices, sodas, get 150 min moderate exercise/week.  Recheck weight in 6 months.      4. Acute bilateral low back pain with bilateral sciatica  Refilled flexeril and she can take 400-600 mg Ibuprofen every 6 hours with food prn low back pain.  She is not interested in physical therapy referral due to her work schedule.  Return to clinic if not improved, new, or worsening symptoms.   - cyclobenzaprine (FLEXERIL) 10 MG tablet; Take 0.5-1 tablets (5-10 mg) by mouth 3 times daily as needed for muscle spasms (may cause dizziness or drowsiness)  Dispense: 40 tablet; Refill: 0    See Patient Instructions    AISLINN Schaeffer [facility] " [facility]

## 2018-10-22 NOTE — LETTER
October 24, 2018      Yandy Crook  [address] [address]              Onel Kebede,    I am happy to let you know that your labs look good.  Keep up the good work!  Feel free to contact me with any questions or concerns.  Thank you for allowing me to participate in your care.    Makeda Wu APRN, CNP/ag  Results for orders placed or performed in visit on 10/22/18   LDL cholesterol direct   Result Value Ref Range    LDL Cholesterol Direct 102 (H) <100 mg/dL   Albumin Random Urine Quantitative with Creat Ratio   Result Value Ref Range    Creatinine Urine 80 mg/dL    Albumin Urine mg/L 6 mg/L    Albumin Urine mg/g Cr 7.44 0 - 25 mg/g Cr   Comprehensive metabolic panel   Result Value Ref Range    Sodium 141 133 - 144 mmol/L    Potassium 4.0 3.4 - 5.3 mmol/L    Chloride 107 94 - 109 mmol/L    Carbon Dioxide 30 20 - 32 mmol/L    Anion Gap 4 3 - 14 mmol/L    Glucose 86 70 - 99 mg/dL    Urea Nitrogen 13 7 - 30 mg/dL    Creatinine 0.93 0.52 - 1.04 mg/dL    GFR Estimate 61 >60 mL/min/1.7m2    GFR Estimate If Black 74 >60 mL/min/1.7m2    Calcium 9.1 8.5 - 10.1 mg/dL    Bilirubin Total 0.4 0.2 - 1.3 mg/dL    Albumin 4.4 3.4 - 5.0 g/dL    Protein Total 8.5 6.8 - 8.8 g/dL    Alkaline Phosphatase 57 40 - 150 U/L    ALT 35 0 - 50 U/L    AST 23 0 - 45 U/L

## 2018-10-23 LAB
ALBUMIN SERPL-MCNC: 4.4 G/DL (ref 3.4–5)
ALP SERPL-CCNC: 57 U/L (ref 40–150)
ALT SERPL W P-5'-P-CCNC: 35 U/L (ref 0–50)
ANION GAP SERPL CALCULATED.3IONS-SCNC: 4 MMOL/L (ref 3–14)
AST SERPL W P-5'-P-CCNC: 23 U/L (ref 0–45)
BILIRUB SERPL-MCNC: 0.4 MG/DL (ref 0.2–1.3)
BUN SERPL-MCNC: 13 MG/DL (ref 7–30)
CALCIUM SERPL-MCNC: 9.1 MG/DL (ref 8.5–10.1)
CHLORIDE SERPL-SCNC: 107 MMOL/L (ref 94–109)
CO2 SERPL-SCNC: 30 MMOL/L (ref 20–32)
CREAT SERPL-MCNC: 0.93 MG/DL (ref 0.52–1.04)
CREAT UR-MCNC: 80 MG/DL
GFR SERPL CREATININE-BSD FRML MDRD: 61 ML/MIN/1.7M2
GLUCOSE SERPL-MCNC: 86 MG/DL (ref 70–99)
LDLC SERPL DIRECT ASSAY-MCNC: 102 MG/DL
MICROALBUMIN UR-MCNC: 6 MG/L
MICROALBUMIN/CREAT UR: 7.44 MG/G CR (ref 0–25)
POTASSIUM SERPL-SCNC: 4 MMOL/L (ref 3.4–5.3)
PROT SERPL-MCNC: 8.5 G/DL (ref 6.8–8.8)
SODIUM SERPL-SCNC: 141 MMOL/L (ref 133–144)

## 2018-11-05 ENCOUNTER — OFFICE VISIT (OUTPATIENT)
Dept: FAMILY MEDICINE | Facility: CLINIC | Age: 63
End: 2018-11-05
Payer: COMMERCIAL

## 2018-11-05 VITALS
BODY MASS INDEX: 27.3 KG/M2 | HEART RATE: 79 BPM | OXYGEN SATURATION: 99 % | WEIGHT: 135.4 LBS | SYSTOLIC BLOOD PRESSURE: 132 MMHG | HEIGHT: 59 IN | DIASTOLIC BLOOD PRESSURE: 78 MMHG | TEMPERATURE: 98 F

## 2018-11-05 DIAGNOSIS — Z11.59 NEED FOR HEPATITIS C SCREENING TEST: ICD-10-CM

## 2018-11-05 DIAGNOSIS — E78.5 HYPERLIPIDEMIA LDL GOAL <130: ICD-10-CM

## 2018-11-05 DIAGNOSIS — E66.3 OVERWEIGHT (BMI 25.0-29.9): ICD-10-CM

## 2018-11-05 DIAGNOSIS — Z11.4 SCREENING FOR HIV (HUMAN IMMUNODEFICIENCY VIRUS): ICD-10-CM

## 2018-11-05 DIAGNOSIS — I10 ESSENTIAL HYPERTENSION: Primary | ICD-10-CM

## 2018-11-05 PROCEDURE — 99214 OFFICE O/P EST MOD 30 MIN: CPT | Performed by: NURSE PRACTITIONER

## 2018-11-05 RX ORDER — LISINOPRIL 20 MG/1
20 TABLET ORAL DAILY
Qty: 90 TABLET | Refills: 3 | Status: SHIPPED | OUTPATIENT
Start: 2018-11-05 | End: 2020-01-02

## 2018-11-05 RX ORDER — SIMVASTATIN 20 MG
20 TABLET ORAL AT BEDTIME
Qty: 90 TABLET | Refills: 3 | Status: SHIPPED | OUTPATIENT
Start: 2018-11-05

## 2018-11-05 NOTE — PROGRESS NOTES
SUBJECTIVE:   Yandy Crook is a 63 year old female who presents to clinic today for the following health issues:    Hyperlipidemia Follow-Up      Rate your low fat/cholesterol diet?: poor    Taking statin?  Yes, no muscle aches from statin    Other lipid medications/supplements?:  none    Hypertension Follow-up      Outpatient blood pressures are not being checked.    Low Salt Diet: low salt      Amount of exercise or physical activity: None    Problems taking medications regularly: No    Medication side effects: none    Diet: regular (no restrictions)        Problem list and histories reviewed & adjusted, as indicated.  Additional history: as documented    Patient Active Problem List   Diagnosis     Insomnia     Hyperlipidemia LDL goal <130     Essential hypertension     Overweight (BMI 25.0-29.9)     Vitamin D deficiency     History reviewed. No pertinent surgical history.    Social History   Substance Use Topics     Smoking status: Never Smoker     Smokeless tobacco: Never Used     Alcohol use No     Family History   Problem Relation Age of Onset     Family History Negative No family hx of          Current Outpatient Prescriptions   Medication Sig Dispense Refill     aspirin 81 MG chewable tablet Take 1 tablet (81 mg) by mouth daily 90 tablet 3     calcium-vitamin D (CALTRATE) 600-400 MG-UNIT per tablet        cetirizine (ZYRTEC) 10 MG tablet Take 10 mg by mouth daily.       cyclobenzaprine (FLEXERIL) 10 MG tablet Take 0.5-1 tablets (5-10 mg) by mouth 3 times daily as needed for muscle spasms (may cause dizziness or drowsiness) 40 tablet 0     lisinopril (PRINIVIL/ZESTRIL) 20 MG tablet Take 1 tablet (20 mg) by mouth daily 90 tablet 3     order for DME Equipment being ordered: BP cuff/machine 1 Device 0     simvastatin (ZOCOR) 20 MG tablet Take 1 tablet (20 mg) by mouth At Bedtime 90 tablet 3     BP Readings from Last 3 Encounters:   11/05/18 132/78   10/22/18 170/80   05/11/18 148/82    Wt Readings from Last 3  "Encounters:   11/05/18 135 lb 6.4 oz (61.4 kg)   10/22/18 137 lb (62.1 kg)   05/11/18 138 lb 3.2 oz (62.7 kg)                    Reviewed and updated as needed this visit by clinical staff  Tobacco  Allergies  Meds  Med Hx  Surg Hx  Fam Hx  Soc Hx      Reviewed and updated as needed this visit by Provider         ROS:  Constitutional, HEENT, cardiovascular, pulmonary, gi and gu systems are negative, except as otherwise noted.    OBJECTIVE:     /78  Pulse 79  Temp 98  F (36.7  C) (Oral)  Ht 4' 10.66\" (1.49 m)  Wt 135 lb 6.4 oz (61.4 kg)  SpO2 99%  BMI 27.66 kg/m2  Body mass index is 27.66 kg/(m^2).  GENERAL: healthy, alert and no distress  EYES: Eyes grossly normal to inspection, PERRL and conjunctivae and sclerae normal  HENT: ear canals and TM's normal, nose and mouth without ulcers or lesions  NECK: no adenopathy, no asymmetry, masses, or scars and thyroid normal to palpation  RESP: lungs clear to auscultation - no rales, rhonchi or wheezes  CV: regular rate and rhythm, normal S1 S2, no S3 or S4, no murmur, click or rub, no peripheral edema and peripheral pulses strong  ABDOMEN: soft, nontender, no hepatosplenomegaly, no masses and bowel sounds normal  MS: no gross musculoskeletal defects noted, no edema  SKIN: no suspicious lesions or rashes  PSYCH: mentation appears normal, affect normal/bright  LYMPH: normal ant/post cervical, supraclavicular nodes    Diagnostic Test Results:  Results for orders placed or performed in visit on 10/22/18   LDL cholesterol direct   Result Value Ref Range    LDL Cholesterol Direct 102 (H) <100 mg/dL   Albumin Random Urine Quantitative with Creat Ratio   Result Value Ref Range    Creatinine Urine 80 mg/dL    Albumin Urine mg/L 6 mg/L    Albumin Urine mg/g Cr 7.44 0 - 25 mg/g Cr   Comprehensive metabolic panel   Result Value Ref Range    Sodium 141 133 - 144 mmol/L    Potassium 4.0 3.4 - 5.3 mmol/L    Chloride 107 94 - 109 mmol/L    Carbon Dioxide 30 20 - 32 mmol/L " "   Anion Gap 4 3 - 14 mmol/L    Glucose 86 70 - 99 mg/dL    Urea Nitrogen 13 7 - 30 mg/dL    Creatinine 0.93 0.52 - 1.04 mg/dL    GFR Estimate 61 >60 mL/min/1.7m2    GFR Estimate If Black 74 >60 mL/min/1.7m2    Calcium 9.1 8.5 - 10.1 mg/dL    Bilirubin Total 0.4 0.2 - 1.3 mg/dL    Albumin 4.4 3.4 - 5.0 g/dL    Protein Total 8.5 6.8 - 8.8 g/dL    Alkaline Phosphatase 57 40 - 150 U/L    ALT 35 0 - 50 U/L    AST 23 0 - 45 U/L       ASSESSMENT/PLAN:           BMI:   Estimated body mass index is 27.66 kg/(m^2) as calculated from the following:    Height as of this encounter: 4' 10.66\" (1.49 m).    Weight as of this encounter: 135 lb 6.4 oz (61.4 kg).   Weight management plan: Discussed healthy diet and exercise guidelines and patient will follow up in 6 months in clinic to re-evaluate.      1. Essential hypertension  Controlled, refilled Lisinopril, low salt diet, regular exercise, weight loss encouraged.  - lisinopril (PRINIVIL/ZESTRIL) 20 MG tablet; Take 1 tablet (20 mg) by mouth daily  Dispense: 90 tablet; Refill: 3  - order for DME; Equipment being ordered: BP cuff/machine  Dispense: 1 Device; Refill: 0    2. Hyperlipidemia LDL goal <130  Stable, continue Zocor, low chol diet, regular exercise encouraged  - simvastatin (ZOCOR) 20 MG tablet; Take 1 tablet (20 mg) by mouth At Bedtime  Dispense: 90 tablet; Refill: 3    3. Overweight (BMI 25.0-29.9)  Benefits of weight loss reviewed in detail, encouraged her to cut back on the carbohydrates in the diet, consume more fruits and vegetables, drink plenty of water, avoid fruit juices, sodas, get 150 min moderate exercise/week.  Recheck weight in 6 months.      4. Need for hepatitis C screening test  Hep C screen reflex to RNA ordered (future)    5. Screening for HIV (human immunodeficiency virus)    - HIV Screening; Future    See Patient Instructions    AISLINN Schaeffer [facility]  "

## 2018-11-05 NOTE — MR AVS SNAPSHOT
After Visit Summary   11/5/2018    Yandy Crook    MRN: [id number]           Patient Information     Date Of Birth          1955        Visit Information        Provider Department      11/5/2018 4:30 PM Makeda Wu APRN CNP; [employer] [facility]        Today's Diagnoses     Overweight (BMI 25.0-29.9)    -  1    Essential hypertension        Hyperlipidemia LDL goal <130        Need for hepatitis C screening test        Screening for HIV (human immunodeficiency virus)          Care Instructions    At [facility], we strive to deliver an exceptional experience to you, every time we see you.  If you receive a survey in the mail, please send us back your thoughts. We really do value your feedback.    Your care team:                            Family Medicine Internal Medicine   MD Hasmukh Mast MD Shantel Branch-Fleming, MD Katya Georgiev PA-C Megan Hill, APRN CNP Nam Ho, MD Pediatrics   NIECY Kamara, MD Katja Jiang CNP, MD Bethany Templen, MD Deborah Mielke, MD Kim Thein, APRN CNP      Clinic hours: Monday - Thursday 7 am-7 pm; Fridays 7 am-5 pm.   Urgent care: Monday - Friday 11 am-9 pm; Saturday and Sunday 9 am-5 pm.  Pharmacy : Monday -Thursday 8 am-8 pm; Friday 8 am-6 pm; Saturday and Sunday 9 am-5 pm.     Clinic: [phone]   Pharmacy: [phone]        Ki?m Soát v? Áp Apolinar?t Samuels  Áp apolinar?t samuels (hypertension) ???c g?i là k? gi?t ng??i th?m l?ng. Ch? vì có huong?u ng??i không bi?t ?i?u này. Áp apolinar?t bình th??ng d??i 120/80. Bi?t rõ v? áp apolinar?t c?a quý v? và nh? ki?m tra áp apolinar?t th??ng xuyên. Làm nh? v?y có th? c?u ???c tính m?ng c?a quý v?.    Ch?n các th?c ?n lành m?nh cho jen.?n b?t mu?i và dillon ch? ?? ?n u?ng DASH (DASH diet).    Ch?n các th?c ph?m ít mu?i, ít ch?t béo.    Gi?i h?n ?n th?c ph?m ?óng h?p, làm khô, ?ã x? lý, ?óng  gói, và ?n nhanh. Các lo?i th?c ph?m này có r?t huong?u mu?i.    ?n t? 8-10 ph?n ?n trái cây và claudia m?i ngày.    Ch?n th?t n?c, cá, ho?c gà.    ?n mì s?i nguyên h?t, c?m, và ??u.    ?n t? 2-3 ph?n ?n các s?n ph?m s?a ít béo ho?c không béo.    H?i bác s? quý v? v? ch??ng trình ?n DASH. Ch??ng trình này giúp làm gi?m áp rosa maria?t.     Vi?c quá n?ng cân không t?t cho jen c?a quý v?. Jose Martin trì m?t cân l??ng lành m?nh.    H?i nhân viên y t? ?n mayo nhiêu ca-lo-ri (calories) m?t ngày. Ciara ?ó dillon ?úng con s? ?ó.    H?i nhân viên y t? m?c cân l??ng nào lành m?nh nh?t cho quý v?. N?u quý v? quá n?ng cân, chô? c?n s?t 10 cân Tere là có th? giúp làm h? áp rosa maria?t.    Gi?i h?n ?n v?t và ?n ch?t ng?t.    T?p th? d?c ??u ??n.    C?ng gi?ng nh? các b?p th?t nào khác, jen c?n t?p th? d?c ?? gi? cho kh?e m?nh. Ng?i d?y và ho?t ??ng.    Ch?n ho?t ??ng mà quý v? ?a thích. R? dori ?ình và b?n bè cùng brandon dori.    ?i b? hay ?i xe ??p thay vì lái xe. Ho?c ??u xa h?n so v?i các l?i vào tòa nhà.    Dùng b?c roby thay vì ?i roby máy.    Cào lá, làm v??n, ho?c s?a ch?a wayne nhà.    Ho?t ??ng ít nh?t là 30 phút m?i ngày, huong?u ngày wayne tu?n.  Vi?c b? c?ng th?ng ti?p di?n có th? làm t?ng rosa maria?t áp.    Dành thì gi? ?? th? giãn và t?n h??ng cu?c s?ng. Dành th?i tessa ?? c??i ?ùa.    Th?m vi?ng dori ?ình và b?n bè, và gi? l?y các s? thích.  Dùng quá huong?u r??u làm t?ng áp rosa maria?t.    ?àn ông: Không ???c u?ng quá 2 ly m?t ngày.    Ph? n?: Không ???c u?ng quá 1 ly m?t ngày.  Hút thu?c lá s? làm dori t?ng c? nguy b? b?nh jen và ??t q?y c?a quý v?. Bàn v?i nhân viên y t? v? vi?c katrina thu?c lá. H?i xem các ch??ng trình nào t?i ??a ph??ng ho?c c?ng ??ng có th? giúp ?? ???c.  N?u các thay ??i v? l?i s?ng không ??, thì nhân viên y t? c?a quý v? có th? cho dùng thu?c tr? áp rosa maria?t tubbs. Dùng h?t các lo?i thu?c nh? ?ã prashanth ??nh.   Date Last Reviewed: 4/27/2016 2000-2017 [employer]. [address] [place] [address], [place] [address]. All rights reserved. This  "information is not intended as a substitute for professional medical care. Always follow your healthcare professional's instructions.                Follow-ups after your visit        Future tests that were ordered for you today     Open Future Orders        Priority Expected Expires Ordered    HIV Screening Routine  2019            Who to contact     If you have questions or need follow up information about today's clinic visit or your schedule please contact [facility] directly at [phone].  Normal or non-critical lab and imaging results will be communicated to you by [employer], letter or phone within 4 business days after the clinic has received the results. If you do not hear from us within 7 days, please contact the clinic through [employer] or phone. If you have a critical or abnormal lab result, we will notify you by phone as soon as possible.  Submit refill requests through [employer] or call your pharmacy and they will forward the refill request to us. Please allow 3 business days for your refill to be completed.          Additional Information About Your Visit        [employer] Information     [employer] lets you send messages to your doctor, view your test results, renew your prescriptions, schedule appointments and more. To sign up, go to [email or web address] . Click on \"Log in\" on the left side of the screen, which will take you to the Welcome page. Then click on \"Sign up Now\" on the right side of the page.     You will be asked to enter the access code listed below, as well as some personal information. Please follow the directions to create your username and password.     Your access code is: [email or web address]  Expires: 2019  6:05 PM     Your access code will  in 90 days. If you need help or a new code, please call your [facility] or [phone].        Care EveryWhere ID     This is your Care EveryWhere ID. This could be used by other organizations to access " "your [facility] medical records  [id number]        Your Vitals Were     Pulse Temperature Height Pulse Oximetry BMI (Body Mass Index)       79 98  F (36.7  C) (Oral) 4' 10.66\" (1.49 m) 99% 27.66 kg/m2        Blood Pressure from Last 3 Encounters:   11/05/18 132/78   10/22/18 170/80   05/11/18 148/82    Weight from Last 3 Encounters:   11/05/18 135 lb 6.4 oz (61.4 kg)   10/22/18 137 lb (62.1 kg)   05/11/18 138 lb 3.2 oz (62.7 kg)                 Today's Medication Changes          These changes are accurate as of 11/5/18  5:06 PM.  If you have any questions, ask your nurse or doctor.               Start taking these medicines.        Dose/Directions    order for DME   Used for:  Essential hypertension   Started by:  Makeda Wu APRN CNP        Equipment being ordered: BP cuff/machine   Quantity:  1 Device   Refills:  0            Where to get your medicines      These medications were sent to [facility] [address] [place] [address] - [address] [facility] [address]  [address] [facility] [address]     Phone:  [phone]     lisinopril 20 MG tablet    simvastatin 20 MG tablet         Some of these will need a paper prescription and others can be bought over the counter.  Ask your nurse if you have questions.     Bring a paper prescription for each of these medications     order for DME                Primary Care Provider Fax #    Physician No Ref-Primary [phone]       No address on file        Equal Access to Services     ALYSSA LYLES : Hadjerrell fish Soomar, waaxda luqadaha, qaybta kaalmada enriqueta, kelly idiin hayaan adeeg kharash la'aan . So [facility] [phone].    ATENCIÓN: Si habla español, tiene a garcia disposición servicios gratuitos de asistencia lingüística. Llame al [phone].    We comply with applicable federal civil rights laws and [place] laws. We do not discriminate on the basis of race, color, national origin, age, disability, sex, " sexual orientation, or gender identity.            Thank you!     Thank you for choosing [facility]  for your care. Our goal is always to provide you with excellent care. Hearing back from our patients is one way we can continue to improve our services. Please take a few minutes to complete the written survey that you may receive in the mail after your visit with us. Thank you!             Your Updated Medication List - Protect others around you: Learn how to safely use, store and throw away your medicines at www.disposemymeds.org.          This list is accurate as of 11/5/18  5:06 PM.  Always use your most recent med list.                   Brand Name Dispense Instructions for use Diagnosis    aspirin 81 MG chewable tablet     90 tablet    Take 1 tablet (81 mg) by mouth daily    Essential hypertension       calcium carbonate 600 mg-vitamin D 400 units 600-400 MG-UNIT per tablet    CALTRATE          cetirizine 10 MG tablet    zyrTEC     Take 10 mg by mouth daily.        cyclobenzaprine 10 MG tablet    FLEXERIL    40 tablet    Take 0.5-1 tablets (5-10 mg) by mouth 3 times daily as needed for muscle spasms (may cause dizziness or drowsiness)    Acute bilateral low back pain with bilateral sciatica       lisinopril 20 MG tablet    PRINIVIL/ZESTRIL    90 tablet    Take 1 tablet (20 mg) by mouth daily    Essential hypertension       order for DME     1 Device    Equipment being ordered: BP cuff/machine    Essential hypertension       simvastatin 20 MG tablet    ZOCOR    90 tablet    Take 1 tablet (20 mg) by mouth At Bedtime    Hyperlipidemia LDL goal <130

## 2018-11-05 NOTE — PATIENT INSTRUCTIONS
At [facility], we strive to deliver an exceptional experience to you, every time we see you.  If you receive a survey in the mail, please send us back your thoughts. We really do value your feedback.    Your care team:                            Family Medicine Internal Medicine   MD Hasmukh Mast MD Shantel Branch-Fleming, MD Katya Georgiev PA-C Megan Hill, APRN CNP    Bam Gerard, MD Pediatrics   Nir Reid, NIECY Butler, MD Katja Jiang APRN CNP   MD Essence Ramirez MD Deborah Mielke, MD Yandy Wu, APRN [facility]      Clinic hours: Monday - Thursday 7 am-7 pm; Fridays 7 am-5 pm.   Urgent care: Monday - Friday 11 am-9 pm; Saturday and Sunday 9 am-5 pm.  Pharmacy : Monday -Thursday 8 am-8 pm; Friday 8 am-6 pm; Saturday and Sunday 9 am-5 pm.     Clinic: [phone]   Pharmacy: [phone]        Ki?m Soát v? Áp Apolinar?t Samuels  Áp apolinar?t samuels (hypertension) ???c g?i là k? gi?t ng??i th?m l?ng. Ch? vì có huong?u ng??i không bi?t ?i?u này. Áp apolinar?t bình th??ng d??i 120/80. Bi?t rõ v? áp apolinar?t c?a quý v? và nh? ki?m tra áp apolinar?t th??ng xuyên. Làm nh? v?y có th? c?u ???c tính m?ng c?a quý v?.    Ch?n các th?c ?n lành m?nh cho jen.?n b?t mu?i và dillon ch? ?? ?n u?ng DASH (DASH diet).    Ch?n các th?c ph?m ít mu?i, ít ch?t béo.    Gi?i h?n ?n th?c ph?m ?óng h?p, làm khô, ?ã x? lý, ?óng gói, và ?n nhanh. Các lo?i th?c ph?m này có r?t huong?u mu?i.    ?n t? 8-10 ph?n ?n trái cây và claudia m?i ngày.    Ch?n th?t n?c, cá, ho?c gà.    ?n mì s?i nguyên h?t, c?m, và ??u.    ?n t? 2-3 ph?n ?n các s?n ph?m s?a ít béo ho?c không béo.    H?i bác s? quý v? v? ch??ng trình ?n DASH. Ch??ng trình này giúp làm gi?m áp apolinar?t.     Vi?c quá n?ng cân không t?t cho jen c?a quý v?. Jose Martin trì m?t cân l??ng lành m?nh.    H?i nhân viên y t? ?n mayo nhiêu ca-lo-ri (calories) m?t ngày. Ciara ?ó dillon ?úng con s? ?ó.    H?i nhân viên y t? m?c cân l??ng nào lành m?nh nh?t cho quý  v?. N?u quý v? quá n?ng cân, chô? c?n s?t 10 cân Tere là có th? giúp làm h? áp rosa maria?t.    Gi?i h?n ?n v?t và ?n ch?t ng?t.    T?p th? d?c ??u ??n.    C?ng gi?ng nh? các b?p th?t nào khác, jen c?n t?p th? d?c ?? gi? cho kh?e m?nh. Ng?i d?y và ho?t ??ng.    Ch?n ho?t ??ng mà quý v? ?a thích. R? dori ?ình và b?n bè cùng brandon dori.    ?i b? hay ?i xe ??p thay vì lái xe. Ho?c ??u xa h?n so v?i các l?i vào tòa nhà.    Dùng b?c roby thay vì ?i roby máy.    Cào lá, làm v??n, ho?c s?a ch?a wayne nhà.    Ho?t ??ng ít nh?t là 30 phút m?i ngày, huong?u ngày wayne tu?n.  Vi?c b? c?ng th?ng ti?p di?n có th? làm t?ng rosa maria?t áp.    Dành thì gi? ?? th? giãn và t?n h??ng cu?c s?ng. Dành th?i tessa ?? c??i ?ùa.    Th?m vi?ng dori ?ình và b?n bè, và gi? l?y các s? thích.  Dùng quá huong?u r??u làm t?ng áp rosa maria?t.    ?àn ông: Không ???c u?ng quá 2 ly m?t ngày.    Ph? n?: Không ???c u?ng quá 1 ly m?t ngày.  Hút thu?c lá s? làm dori t?ng c? nguy b? b?nh jen và ??t q?y c?a quý v?. Bàn v?i nhân viên y t? v? vi?c katrina thu?c lá. H?i xem các ch??ng trình nào t?i ??a ph??ng ho?c c?ng ??ng có th? giúp ?? ???c.  N?u các thay ??i v? l?i s?ng không ??, thì nhân viên y t? c?a quý v? có th? cho dùng thu?c tr? áp rosa maria?t tubbs. Dùng h?t các lo?i thu?c nh? ?ã prashanth ??nh.   Date Last Reviewed: 4/27/2016 2000-2017 [employer]. [address] [place] [address], [place] [address]. All rights reserved. This information is not intended as a substitute for professional medical care. Always follow your healthcare professional's instructions.

## 2019-07-17 ENCOUNTER — TELEPHONE (OUTPATIENT)
Dept: FAMILY MEDICINE | Facility: CLINIC | Age: 64
End: 2019-07-17

## 2019-07-17 NOTE — TELEPHONE ENCOUNTER
Panel Management Review   One phone call and send letter if unable to reach them or "[employer]hart message and send letter if not read after 2 weeks (You will get a message to your inbasket)      BP Readings from Last 1 Encounters:   11/05/18 132/78        Health Maintenance Due   Topic Date Due     PREVENTIVE CARE VISIT  1955     HEPATITIS C SCREENING  1955     ADVANCE CARE PLANNING  1955     HIV SCREENING  09/16/1972     LIPID  11/08/2010     DTAP/TDAP/TD IMMUNIZATION (2 - Td) 05/29/2013     ZOSTER IMMUNIZATION (2 of 3) 12/15/2017     PHQ-2  01/01/2019        Fail List measure: BMI        Patient is due/failing the following:   BMI    Action needed:   Patient needs office visit for Preventative Care Visit.    Type of outreach:    Phone, left message for patient to call back.     Questions for provider review:    None                                                                                                                        Sandra Burks

## 2019-12-29 DIAGNOSIS — I10 ESSENTIAL HYPERTENSION: ICD-10-CM

## 2019-12-29 NOTE — TELEPHONE ENCOUNTER
"Requested Prescriptions   Pending Prescriptions Disp Refills     lisinopril (PRINIVIL/ZESTRIL) 20 MG tablet [Pharmacy Med Name: LISINOPRIL 20MG TABLETS]  Last Written Prescription Date:  11/5/18  Last Fill Quantity: 90,  # refills: 3   Last Office Visit with G, P or [facility] prescribing provider:  11/5/18   Future Office Visit:      90 tablet 3     Sig: TAKE 1 TABLET(20 MG) BY MOUTH DAILY       ACE Inhibitors (Including Combos) Protocol Failed - 12/29/2019 10:17 AM        Failed - Blood pressure under 140/90 in past 12 months     BP Readings from Last 3 Encounters:   11/05/18 132/78   10/22/18 170/80   05/11/18 148/82                 Failed - Recent (12 mo) or future (30 days) visit within the authorizing provider's specialty     Patient has had an office visit with the authorizing provider or a provider within the authorizing providers department within the previous 12 mos or has a future within next 30 days. See \"Patient Info\" tab in inbasket, or \"Choose Columns\" in Meds & Orders section of the refill encounter.              Failed - Normal serum creatinine on file in past 12 months     Recent Labs   Lab Test 10/22/18  1908   CR 0.93             Failed - Normal serum potassium on file in past 12 months     Recent Labs   Lab Test 10/22/18  1908   POTASSIUM 4.0             Passed - Medication is active on med list        Passed - Patient is age 18 or older        Passed - No active pregnancy on record        Passed - No positive pregnancy test within past 12 months          "

## 2020-01-02 RX ORDER — LISINOPRIL 20 MG/1
20 TABLET ORAL DAILY
Qty: 30 TABLET | Refills: 0 | Status: SHIPPED | OUTPATIENT
Start: 2020-01-02

## 2020-01-02 NOTE — TELEPHONE ENCOUNTER
Medication is being filled for 1 time refill only due to:  Patient needs to be seen because it has been more than one year since last visit.    30 day ha refill given, per [facility] refill protocol    Navya Alejandre RN  [facility]